# Patient Record
Sex: FEMALE
[De-identification: names, ages, dates, MRNs, and addresses within clinical notes are randomized per-mention and may not be internally consistent; named-entity substitution may affect disease eponyms.]

---

## 2018-03-13 ENCOUNTER — HOSPITAL ENCOUNTER (OUTPATIENT)
Dept: HOSPITAL 25 - OREAST | Age: 58
Discharge: HOME | End: 2018-03-13
Attending: OPHTHALMOLOGY
Payer: COMMERCIAL

## 2018-03-13 VITALS — DIASTOLIC BLOOD PRESSURE: 84 MMHG | SYSTOLIC BLOOD PRESSURE: 144 MMHG

## 2018-03-13 DIAGNOSIS — H50.112: Primary | ICD-10-CM

## 2018-03-14 NOTE — OP
OPERATIVE REPORT:

 

DATE OF OPERATION:  03/13/18

 

DATE OF BIRTH:  [birth year]

 

SURGEON:  Zach Holt MD

 

ASSISTANT:  None.

 

ANESTHESIA:  General.

 

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

 

OPERATIVE PROCEDURE:  Recess lateral rectus muscle 7 mm each eye.

 

COMPLICATIONS:  None.

 

BLOOD LOSS:  Minimal.

 

DESCRIPTION OF PROCEDURE:  The patient was brought to the operating room and received general anesthe
julia.  A drop of tetracaine and a drop of phenylephrine was placed in each eye.  The patient was prepp
ed and draped in the usual sterile fashion for ophthalmic surgery and attention was directed to the r
ight eye where a speculum was placed.  Forced ductions were performed and found to be normal.  Some n
tom scarring was noted in the conjunctiva.  The speculum was removed and placed in the left eye, whe
re again forced ductions were performed and found to be normal and nasal scarring of the conjunctiva 
was also noted.  The left eye was then grasped in the inferotemporal quadrant near the limbus and bro
ught to superonasal gaze.  An inferotemporal fornix incision was created with a Gisel scissors in 
the conjunctiva.  The Tenon's capsule was violated.  The lateral rectus muscle was isolated on a Christian
son muscle hook.  The conjunctiva was reflected over the surface and muscle and the check ligament wa
s opened.  The muscle was cleaned with sharp and blunt dissection.  A double-arm 6-0 Vicryl suture wa
s woven through the muscle near the insertion and locked at either end.  Also, was disinserted from t
he globe with the Gisel scissors.  Locking forceps were used to grasp the original insertion site.
  The muscle was inspected and found to be in good condition on these sutures.  Hemostasis at the christophe
ginal insertion site was achieved with gentle cauterization.  A alban was made on the sclera at 7.0 mm
 posterior to the original insertion using a caliper.  The muscle was recessed to this point.  The blanco
tures were tied securely.  The muscle was inspected and found to be in good position without any blee
ding.  The locking forceps were removed.  The conjunctiva was closed with interrupted 6-0 gut sutures
.  The speculum was removed and placed in the contralateral eye.  Here the exact same procedure was p
erformed.  At the end of the case, the eyes appeared straight and there was no active bleeding.  The 
speculum was removed and topical Maxitrol ointment and a drop of tetracaine was placed in the each ey
e.  The patient was awakened uneventfully and sent to recovery room in stable condition with postoper
ative instructions and followup appointment given.

 

 [id number]/CPS #: [id number]